# Patient Record
Sex: FEMALE | Race: WHITE | NOT HISPANIC OR LATINO | Employment: OTHER | URBAN - METROPOLITAN AREA
[De-identification: names, ages, dates, MRNs, and addresses within clinical notes are randomized per-mention and may not be internally consistent; named-entity substitution may affect disease eponyms.]

---

## 2017-09-21 ENCOUNTER — ESTABLISHED COMPREHENSIVE EXAM (OUTPATIENT)
Dept: URBAN - METROPOLITAN AREA CLINIC 39 | Facility: CLINIC | Age: 77
End: 2017-09-21

## 2017-09-21 DIAGNOSIS — E11.9: ICD-10-CM

## 2017-09-21 DIAGNOSIS — H04.123: ICD-10-CM

## 2017-09-21 DIAGNOSIS — H35.3130: ICD-10-CM

## 2017-09-21 DIAGNOSIS — H43.391: ICD-10-CM

## 2017-09-21 DIAGNOSIS — H26.491: ICD-10-CM

## 2017-09-21 DIAGNOSIS — H35.373: ICD-10-CM

## 2017-09-21 PROCEDURE — G8427 DOCREV CUR MEDS BY ELIG CLIN: HCPCS

## 2017-09-21 PROCEDURE — 4177F TALK PT/CRGVR RE AREDS PREV: CPT

## 2017-09-21 PROCEDURE — 1036F TOBACCO NON-USER: CPT

## 2017-09-21 PROCEDURE — 92014 COMPRE OPH EXAM EST PT 1/>: CPT

## 2017-09-21 PROCEDURE — 3072F LOW RISK FOR RETINOPATHY: CPT

## 2017-09-21 PROCEDURE — G8785 BP SCRN NO PERF AT INTERVAL: HCPCS

## 2017-09-21 PROCEDURE — 92015 DETERMINE REFRACTIVE STATE: CPT

## 2017-09-21 PROCEDURE — 2022F DILAT RTA XM EVC RTNOPTHY: CPT

## 2017-09-21 PROCEDURE — 2019F DILATED MACUL EXAM DONE: CPT

## 2017-09-21 ASSESSMENT — VISUAL ACUITY
OS_SC: 20/25-2
OS_SC: J5
OD_SC: 20/25+2
OS_BAT: 20/200
OD_SC: J5
OS_CC: 20/25
OS_CC: J1+
OD_CC: 20/20-1
OD_CC: J1

## 2017-09-21 ASSESSMENT — TONOMETRY
OS_IOP_MMHG: 14
OD_IOP_MMHG: 14

## 2017-10-09 ENCOUNTER — ESTABLISHED PATIENT (OUTPATIENT)
Dept: URBAN - METROPOLITAN AREA CLINIC 39 | Facility: CLINIC | Age: 77
End: 2017-10-09

## 2017-10-09 VITALS
SYSTOLIC BLOOD PRESSURE: 165 MMHG | RESPIRATION RATE: 16 BRPM | HEART RATE: 70 BPM | DIASTOLIC BLOOD PRESSURE: 68 MMHG | HEIGHT: 55 IN

## 2017-10-09 DIAGNOSIS — H04.123: ICD-10-CM

## 2017-10-09 DIAGNOSIS — H43.391: ICD-10-CM

## 2017-10-09 DIAGNOSIS — H26.491: ICD-10-CM

## 2017-10-09 DIAGNOSIS — H35.3130: ICD-10-CM

## 2017-10-09 DIAGNOSIS — E11.9: ICD-10-CM

## 2017-10-09 DIAGNOSIS — H35.373: ICD-10-CM

## 2017-10-09 PROCEDURE — 1036F TOBACCO NON-USER: CPT

## 2017-10-09 PROCEDURE — 2022F DILAT RTA XM EVC RTNOPTHY: CPT

## 2017-10-09 PROCEDURE — 92014 COMPRE OPH EXAM EST PT 1/>: CPT

## 2017-10-09 PROCEDURE — G8427 DOCREV CUR MEDS BY ELIG CLIN: HCPCS

## 2017-10-09 PROCEDURE — 4177F TALK PT/CRGVR RE AREDS PREV: CPT

## 2017-10-09 PROCEDURE — G8952 PRE-HTN/HTN, NO F/U, NOT GVN: HCPCS

## 2017-10-09 PROCEDURE — 3072F LOW RISK FOR RETINOPATHY: CPT

## 2017-10-09 PROCEDURE — 2019F DILATED MACUL EXAM DONE: CPT

## 2017-10-09 PROCEDURE — 66821 AFTER CATARACT LASER SURGERY: CPT

## 2017-10-09 ASSESSMENT — VISUAL ACUITY
OD_CC: J1
OD_CC: 20/20
OS_SC: 20/25
OS_CC: 20/20
OD_SC: 20/25
OD_SC: J5
OS_SC: J5
OS_BAT: 20/200
OD_BAT: 20/100
OS_CC: J1

## 2017-10-09 ASSESSMENT — TONOMETRY
OS_IOP_MMHG: 19
OD_IOP_MMHG: 19

## 2017-10-18 ENCOUNTER — POST-OP (OUTPATIENT)
Dept: URBAN - METROPOLITAN AREA CLINIC 39 | Facility: CLINIC | Age: 77
End: 2017-10-18

## 2017-10-18 DIAGNOSIS — Z98.890: ICD-10-CM

## 2017-10-18 PROCEDURE — 99024 POSTOP FOLLOW-UP VISIT: CPT

## 2017-10-18 ASSESSMENT — KERATOMETRY
OD_AXISANGLE_DEGREES: 45.00
OD_K1POWER_DIOPTERS: 44.75

## 2017-10-18 ASSESSMENT — TONOMETRY
OS_IOP_MMHG: 19
OD_IOP_MMHG: 18

## 2017-10-18 ASSESSMENT — VISUAL ACUITY
OD_SC: 20/20-2
OS_SC: 20/25-1

## 2018-09-18 ENCOUNTER — ESTABLISHED COMPREHENSIVE EXAM (OUTPATIENT)
Dept: URBAN - METROPOLITAN AREA CLINIC 39 | Facility: CLINIC | Age: 78
End: 2018-09-18

## 2018-09-18 DIAGNOSIS — H43.391: ICD-10-CM

## 2018-09-18 DIAGNOSIS — H35.373: ICD-10-CM

## 2018-09-18 DIAGNOSIS — Z79.4: ICD-10-CM

## 2018-09-18 DIAGNOSIS — E11.9: ICD-10-CM

## 2018-09-18 DIAGNOSIS — H35.3130: ICD-10-CM

## 2018-09-18 DIAGNOSIS — H04.123: ICD-10-CM

## 2018-09-18 PROCEDURE — G8427 DOCREV CUR MEDS BY ELIG CLIN: HCPCS

## 2018-09-18 PROCEDURE — 3072F LOW RISK FOR RETINOPATHY: CPT

## 2018-09-18 PROCEDURE — 1036F TOBACCO NON-USER: CPT

## 2018-09-18 PROCEDURE — G8785 BP SCRN NO PERF AT INTERVAL: HCPCS

## 2018-09-18 PROCEDURE — 92014 COMPRE OPH EXAM EST PT 1/>: CPT

## 2018-09-18 PROCEDURE — 2022F DILAT RTA XM EVC RTNOPTHY: CPT

## 2018-09-18 PROCEDURE — G9903 PT SCRN TBCO ID AS NON USER: HCPCS

## 2018-09-18 PROCEDURE — 92015 DETERMINE REFRACTIVE STATE: CPT

## 2018-09-18 PROCEDURE — 2019F DILATED MACUL EXAM DONE: CPT

## 2018-09-18 PROCEDURE — 4177F TALK PT/CRGVR RE AREDS PREV: CPT

## 2018-09-18 ASSESSMENT — KERATOMETRY
OD_K1POWER_DIOPTERS: 44.75
OD_AXISANGLE_DEGREES: 45.00

## 2018-09-18 ASSESSMENT — VISUAL ACUITY
OS_SC: J6
OS_SC: 20/30-1
OD_SC: J5
OD_SC: 20/25-2

## 2018-09-18 ASSESSMENT — TONOMETRY
OD_IOP_MMHG: 16
OS_IOP_MMHG: 16

## 2018-10-02 ENCOUNTER — ESTABLISHED PATIENT (OUTPATIENT)
Dept: URBAN - METROPOLITAN AREA CLINIC 39 | Facility: CLINIC | Age: 78
End: 2018-10-02

## 2018-10-02 DIAGNOSIS — H35.3130: ICD-10-CM

## 2018-10-02 DIAGNOSIS — H35.373: ICD-10-CM

## 2018-10-02 DIAGNOSIS — H53.2: ICD-10-CM

## 2018-10-02 PROCEDURE — G8427 DOCREV CUR MEDS BY ELIG CLIN: HCPCS

## 2018-10-02 PROCEDURE — G9903 PT SCRN TBCO ID AS NON USER: HCPCS

## 2018-10-02 PROCEDURE — 92134 CPTRZ OPH DX IMG PST SGM RTA: CPT

## 2018-10-02 PROCEDURE — G8785 BP SCRN NO PERF AT INTERVAL: HCPCS

## 2018-10-02 PROCEDURE — 1036F TOBACCO NON-USER: CPT

## 2018-10-02 PROCEDURE — 92012 INTRM OPH EXAM EST PATIENT: CPT

## 2018-10-02 ASSESSMENT — VISUAL ACUITY
OS_SC: J5
OD_CC: J1+
OS_CC: 20/25-1
OD_SC: 20/20-2
OS_SC: 20/25-2
OD_CC: 20/20-2
OD_SC: J6
OS_CC: J1+

## 2018-10-02 ASSESSMENT — KERATOMETRY
OD_K1POWER_DIOPTERS: 44.75
OD_AXISANGLE_DEGREES: 45.00

## 2019-04-18 ENCOUNTER — ESTABLISHED COMPREHENSIVE EXAM (OUTPATIENT)
Dept: URBAN - METROPOLITAN AREA CLINIC 39 | Facility: CLINIC | Age: 79
End: 2019-04-18

## 2019-04-18 DIAGNOSIS — E11.9: ICD-10-CM

## 2019-04-18 DIAGNOSIS — H35.373: ICD-10-CM

## 2019-04-18 DIAGNOSIS — H04.123: ICD-10-CM

## 2019-04-18 DIAGNOSIS — H26.492: ICD-10-CM

## 2019-04-18 DIAGNOSIS — H43.391: ICD-10-CM

## 2019-04-18 DIAGNOSIS — H53.2: ICD-10-CM

## 2019-04-18 DIAGNOSIS — H35.3130: ICD-10-CM

## 2019-04-18 PROCEDURE — 92015 DETERMINE REFRACTIVE STATE: CPT

## 2019-04-18 PROCEDURE — 92134 CPTRZ OPH DX IMG PST SGM RTA: CPT

## 2019-04-18 PROCEDURE — 92014 COMPRE OPH EXAM EST PT 1/>: CPT

## 2019-04-18 ASSESSMENT — VISUAL ACUITY
OD_CC: J2
OD_SC: <J12
OS_CC: J2
OS_SC: 20/40
OD_SC: 20/25-1
OS_SC: <J12

## 2019-04-18 ASSESSMENT — KERATOMETRY
OD_K1POWER_DIOPTERS: 44.75
OD_AXISANGLE_DEGREES: 45.00

## 2019-04-18 ASSESSMENT — TONOMETRY
OS_IOP_MMHG: 13
OD_IOP_MMHG: 13

## 2019-04-22 ASSESSMENT — KERATOMETRY
OD_AXISANGLE_DEGREES: 45.00
OD_K1POWER_DIOPTERS: 44.75

## 2019-04-23 ENCOUNTER — TECH ONLY (OUTPATIENT)
Dept: URBAN - METROPOLITAN AREA CLINIC 39 | Facility: CLINIC | Age: 79
End: 2019-04-23

## 2019-04-23 DIAGNOSIS — H35.3130: ICD-10-CM

## 2019-04-23 DIAGNOSIS — Z79.4: ICD-10-CM

## 2019-04-23 DIAGNOSIS — H35.373: ICD-10-CM

## 2019-04-23 DIAGNOSIS — H26.492: ICD-10-CM

## 2019-04-23 DIAGNOSIS — E11.9: ICD-10-CM

## 2019-04-23 DIAGNOSIS — H04.123: ICD-10-CM

## 2019-04-23 DIAGNOSIS — H43.391: ICD-10-CM

## 2019-04-23 DIAGNOSIS — H53.2: ICD-10-CM

## 2019-04-23 DIAGNOSIS — H26.40: ICD-10-CM

## 2019-04-23 PROCEDURE — 99211T TECH SERVICE

## 2019-04-23 ASSESSMENT — TONOMETRY
OS_IOP_MMHG: 13
OD_IOP_MMHG: 13

## 2019-04-23 ASSESSMENT — VISUAL ACUITY
OS_SC: 20/50
OD_SC: 20/40+1

## 2021-03-14 NOTE — PATIENT DISCUSSION
Discussed the importance of blood sugar control in the prevention of ocular complications. impairments found/functional limitations in following categories/risk reduction/prevention/rehab potential/therapy frequency/predicted duration of therapy intervention/anticipated equipment needs at discharge/anticipated discharge recommendation

## 2023-08-31 ENCOUNTER — COMPREHENSIVE EXAM (OUTPATIENT)
Dept: URBAN - METROPOLITAN AREA CLINIC 46 | Facility: CLINIC | Age: 83
End: 2023-08-31

## 2023-08-31 DIAGNOSIS — H53.2: ICD-10-CM

## 2023-08-31 DIAGNOSIS — H26.492: ICD-10-CM

## 2023-08-31 DIAGNOSIS — E11.9: ICD-10-CM

## 2023-08-31 DIAGNOSIS — H35.373: ICD-10-CM

## 2023-08-31 DIAGNOSIS — H26.40: ICD-10-CM

## 2023-08-31 DIAGNOSIS — H52.7: ICD-10-CM

## 2023-08-31 DIAGNOSIS — H04.123: ICD-10-CM

## 2023-08-31 PROCEDURE — 92004 COMPRE OPH EXAM NEW PT 1/>: CPT

## 2023-08-31 PROCEDURE — 92015 DETERMINE REFRACTIVE STATE: CPT

## 2023-08-31 ASSESSMENT — VISUAL ACUITY
OU_SC: J12
OD_PH: 20/25
OS_SC: 20/30
OD_BAT: 20/80
OS_SC: J12
OU_SC: 20/30
OD_SC: J12
OD_SC: 20/40

## 2023-08-31 ASSESSMENT — KERATOMETRY
OD_AXISANGLE_DEGREES: 45.00
OD_K1POWER_DIOPTERS: 44.75

## 2023-08-31 ASSESSMENT — TONOMETRY
OS_IOP_MMHG: 14
OD_IOP_MMHG: 14

## 2023-09-11 ENCOUNTER — CONSULTATION/EVALUATION (OUTPATIENT)
Dept: URBAN - METROPOLITAN AREA CLINIC 43 | Facility: CLINIC | Age: 83
End: 2023-09-11

## 2023-09-11 DIAGNOSIS — Z98.890: ICD-10-CM

## 2023-09-11 DIAGNOSIS — H26.492: ICD-10-CM

## 2023-09-11 PROCEDURE — 99214 OFFICE O/P EST MOD 30 MIN: CPT

## 2023-09-11 ASSESSMENT — VISUAL ACUITY
OD_BAT: 20/80
OD_SC: 20/40-1
OS_SC: 20/25+1

## 2023-09-11 ASSESSMENT — TONOMETRY
OS_IOP_MMHG: 18
OD_IOP_MMHG: 18

## 2023-10-16 ENCOUNTER — CLINIC PROCEDURE ONLY (OUTPATIENT)
Dept: URBAN - METROPOLITAN AREA CLINIC 36 | Facility: CLINIC | Age: 83
End: 2023-10-16

## 2023-10-16 DIAGNOSIS — H26.493: ICD-10-CM

## 2023-10-16 PROCEDURE — 66821 AFTER CATARACT LASER SURGERY: CPT

## 2023-10-30 ENCOUNTER — POST-OP (OUTPATIENT)
Dept: URBAN - METROPOLITAN AREA CLINIC 46 | Facility: CLINIC | Age: 83
End: 2023-10-30

## 2023-10-30 DIAGNOSIS — Z98.890: ICD-10-CM

## 2023-10-30 PROCEDURE — 99024 POSTOP FOLLOW-UP VISIT: CPT

## 2023-10-30 ASSESSMENT — VISUAL ACUITY
OS_SC: 20/20-1
OS_CC: J1
OD_CC: J1+
OD_SC: 20/25-1

## 2023-10-30 ASSESSMENT — TONOMETRY
OD_IOP_MMHG: 16
OS_IOP_MMHG: 16

## 2024-10-31 ENCOUNTER — COMPREHENSIVE EXAM (OUTPATIENT)
Dept: URBAN - METROPOLITAN AREA CLINIC 46 | Facility: CLINIC | Age: 84
End: 2024-10-31

## 2024-10-31 DIAGNOSIS — H26.492: ICD-10-CM

## 2024-10-31 DIAGNOSIS — H53.2: ICD-10-CM

## 2024-10-31 DIAGNOSIS — E11.9: ICD-10-CM

## 2024-10-31 DIAGNOSIS — H04.123: ICD-10-CM

## 2024-10-31 DIAGNOSIS — H35.373: ICD-10-CM

## 2024-10-31 DIAGNOSIS — H52.7: ICD-10-CM

## 2024-10-31 PROCEDURE — 92015 DETERMINE REFRACTIVE STATE: CPT

## 2024-10-31 PROCEDURE — 92134 CPTRZ OPH DX IMG PST SGM RTA: CPT

## 2024-10-31 PROCEDURE — 92014 COMPRE OPH EXAM EST PT 1/>: CPT

## 2024-11-12 ENCOUNTER — CONSULTATION/EVALUATION (OUTPATIENT)
Dept: URBAN - METROPOLITAN AREA CLINIC 44 | Facility: CLINIC | Age: 84
End: 2024-11-12

## 2024-11-12 VITALS — WEIGHT: 167 LBS | HEIGHT: 57 IN | BODY MASS INDEX: 36.03 KG/M2

## 2024-11-12 DIAGNOSIS — H02.831: ICD-10-CM

## 2024-11-12 DIAGNOSIS — H02.834: ICD-10-CM

## 2024-11-12 DIAGNOSIS — L98.8: ICD-10-CM

## 2024-11-12 DIAGNOSIS — H02.835: ICD-10-CM

## 2024-11-12 DIAGNOSIS — H04.123: ICD-10-CM

## 2024-11-12 DIAGNOSIS — H02.832: ICD-10-CM

## 2024-11-12 PROCEDURE — 92285 EXTERNAL OCULAR PHOTOGRAPHY: CPT

## 2024-11-12 PROCEDURE — 99214 OFFICE O/P EST MOD 30 MIN: CPT

## 2024-11-14 ENCOUNTER — TECH ONLY (OUTPATIENT)
Dept: URBAN - METROPOLITAN AREA CLINIC 43 | Facility: CLINIC | Age: 84
End: 2024-11-14

## 2024-11-14 DIAGNOSIS — H02.834: ICD-10-CM

## 2024-11-14 DIAGNOSIS — H02.831: ICD-10-CM

## 2024-11-14 PROCEDURE — 99211T TECH SERVICE

## 2024-11-14 PROCEDURE — 92081 LIMITED VISUAL FIELD XM: CPT

## 2025-02-25 ENCOUNTER — PRE-OP/H&P (OUTPATIENT)
Age: 85
End: 2025-02-25

## 2025-02-25 DIAGNOSIS — L98.8: ICD-10-CM

## 2025-02-25 DIAGNOSIS — H02.831: ICD-10-CM

## 2025-02-25 DIAGNOSIS — H02.413: ICD-10-CM

## 2025-02-25 DIAGNOSIS — H02.835: ICD-10-CM

## 2025-02-25 DIAGNOSIS — H02.834: ICD-10-CM

## 2025-02-25 DIAGNOSIS — H02.832: ICD-10-CM

## 2025-02-25 DIAGNOSIS — H04.123: ICD-10-CM

## 2025-02-25 PROCEDURE — 99211T TECH SERVICE
